# Patient Record
Sex: FEMALE | Race: WHITE | NOT HISPANIC OR LATINO | Employment: UNEMPLOYED | URBAN - METROPOLITAN AREA
[De-identification: names, ages, dates, MRNs, and addresses within clinical notes are randomized per-mention and may not be internally consistent; named-entity substitution may affect disease eponyms.]

---

## 2017-04-17 ENCOUNTER — OFFICE VISIT - HEALTHEAST (OUTPATIENT)
Dept: FAMILY MEDICINE | Facility: CLINIC | Age: 37
End: 2017-04-17

## 2017-04-17 DIAGNOSIS — R21 RASH AND NONSPECIFIC SKIN ERUPTION: ICD-10-CM

## 2017-04-17 DIAGNOSIS — R21 PITYRIASIS ROSEA-LIKE SKIN ERUPTION: ICD-10-CM

## 2017-04-17 RX ORDER — SWAB
1 SWAB, NON-MEDICATED MISCELLANEOUS DAILY
Status: SHIPPED | COMMUNITY
Start: 2017-04-17

## 2017-06-09 ENCOUNTER — HOSPITAL ENCOUNTER (OUTPATIENT)
Dept: MEDSURG UNIT | Facility: CLINIC | Age: 37
Discharge: HOME | End: 2017-06-09
Attending: OBSTETRICS & GYNECOLOGY | Admitting: OBSTETRICS & GYNECOLOGY

## 2017-06-09 RX ORDER — FERROUS SULFATE 325(65) MG
1 TABLET ORAL
Status: SHIPPED | COMMUNITY
Start: 2017-06-09

## 2017-06-09 ASSESSMENT — MIFFLIN-ST. JEOR: SCORE: 1457.4

## 2017-08-14 ENCOUNTER — ANESTHESIA - HEALTHEAST (OUTPATIENT)
Dept: OBGYN | Facility: CLINIC | Age: 37
End: 2017-08-14

## 2017-08-16 ENCOUNTER — HOME CARE/HOSPICE - HEALTHEAST (OUTPATIENT)
Dept: HOME HEALTH SERVICES | Facility: HOME HEALTH | Age: 37
End: 2017-08-16

## 2017-08-18 ENCOUNTER — HOME CARE/HOSPICE - HEALTHEAST (OUTPATIENT)
Dept: HOME HEALTH SERVICES | Facility: HOME HEALTH | Age: 37
End: 2017-08-18

## 2017-10-19 ENCOUNTER — COMMUNICATION - HEALTHEAST (OUTPATIENT)
Dept: OBGYN | Facility: CLINIC | Age: 37
End: 2017-10-19

## 2017-10-20 ENCOUNTER — COMMUNICATION - HEALTHEAST (OUTPATIENT)
Dept: OBGYN | Facility: CLINIC | Age: 37
End: 2017-10-20

## 2021-05-30 VITALS — WEIGHT: 172.1 LBS | BODY MASS INDEX: 29.54 KG/M2

## 2021-05-31 VITALS — HEIGHT: 64 IN | WEIGHT: 178 LBS | BODY MASS INDEX: 30.39 KG/M2

## 2021-06-10 NOTE — PROGRESS NOTES
Assessment/Plan    ICD-10-CM    1. Rash and nonspecific skin eruption R21    2. Pityriasis rosea-like skin eruption R21      Unclear if pityriasis rosea.  Has large patch under her left breast but not sure if this was the first lesion.  Discussed with patient.  Will recheck with her OB.      Patient Instructions   Use cool compresses to the rash.   Follow up with OB regarding rash and sinus symptoms.  If sinus symptoms increase follow up sooner to reconsider restarting antibiotics.  Can do trial of steam and heat to the face.             The risks, benefits, and treatment options of prescribed medications or other treatments have been discussed with the patient. The patient should call or schedule a follow up appointment if not improvement or any new symptoms.       Subjective:    Danelle Ford is a 36 y.o. female who presents with   Chief Complaint   Patient presents with     Skin Problem     x 1-1.5 weeks, Started spreading Friday.    .     Started with two red spots one on her thigh and one on her stomach.  3 days ago noted spread on her abdomen, sides chest and back.  She has a couple spots on her arms that are itchy but the rest of the rash is not itchy.  She was on an antibiotic for sinus infection ( augmentin) and she finished it around April 2nd or so.  She is pregnant 24 weeks with EDC of August 9th.  She has an appointment to see her OB doctor in 9 days.  She has nasal congestion, sinus pressure and dry eyes.  She has been taking sudafed for her congestion.  Her symptoms improved on the Augmentin which she took for 5 days.  Her symptoms returned in one week after finishing the augmentin.        Past medical history, social history and medications reviewed in the medical record.     ROS:  5 point review of systems negative except as noted above in the HPI, including Gen, Resp., CV, GI &  system review.     Exam:  /60  Pulse 83  Temp 98.3  F (36.8  C) (Oral)   Resp 14  Wt 172 lb 1.6 oz (78.1  kg)  LMP 11/02/2016  SpO2 98%  BMI 29.54 kg/m2     Constitutional: Non-toxic appearance. No distress.   HENT:   Right Ear: Tympanic membrane normal.   Left Ear: Tympanic membrane normal.   Mouth/Throat: Oropharynx is clear and moist and mucous membranes are normal. No oropharyngeal exudate. No tenderness noted over the maxillary sinuses.   Eyes: Conjunctivae are normal.   Neck: Neck supple.   Cardiovascular: Normal rate and regular rhythm.   No murmur heard.   Pulmonary/Chest: No respiratory distress. No wheezes or rales.   Lymphadenopathy: No cervical adenopathy.   Abdomen:  Soft, pregnancy noted, non-tender.   Skin: Skin is warm and dry. Has erythematous oval macular rash noted. One larger lesion with central clearing under her left breast. Rash mostly on chest, back, few spots on arms and legs.       No results found for this visit on 04/17/17.    No results found.

## 2021-06-11 NOTE — PROGRESS NOTES
who came here with c/o spont leaking of vaginal fluid starting at 1130 today. No bleeding or UC's per pt. Good fetal movement. No PIH s/s. Oriented to room and EFM applied.

## 2021-06-12 NOTE — ANESTHESIA POSTPROCEDURE EVALUATION
Patient: Danelle Ford  * No procedures listed *  Anesthesia type: ITN    Patient location: Labor and Delivery  Last vitals:   Vitals:    08/15/17 0500   BP: 122/70   Pulse: 71   Resp: 16   Temp: 36.8  C (98.2  F)   SpO2:      Post vital signs: stable  Level of consciousness: awake and responds to simple questions  Post-anesthesia pain: pain controlled  Post-anesthesia nausea and vomiting: no  Pulmonary: unassisted, return to baseline  Cardiovascular: stable and blood pressure at baseline  Hydration: adequate  Anesthetic events: no    QCDR Measures:  ASA# 11 - Pinky-op Cardiac Arrest: ASA11B - Patient did NOT experience unanticipated cardiac arrest  ASA# 12 - Pinky-op Mortality Rate: ASA12B - Patient did NOT die  ASA# 13 - PACU Re-Intubation Rate: NA - No ETT / LMA used for case  ASA# 10 - Composite Anes Safety: ASA10A - No serious adverse event  ASA# 38 - New Corneal Injury: ASA38A - No new exposure keratitis or corneal abrasion in PACU    Additional Notes:  No HA at this time

## 2021-06-12 NOTE — ANESTHESIA PROCEDURE NOTES
Spinal Block    Patient location during procedure: OB  Start time: 8/14/2017 5:33 PM  End time: 8/14/2017 5:49 PM    Staffing:  Performing  Anesthesiologist: INDY CASTANEDA    Preanesthetic Checklist  Completed: patient identified, risks, benefits, and alternatives discussed, timeout performed, consent obtained, at patient's request, airway assessed, oxygen available, suction available, emergency drugs available and hand hygiene performed  Spinal Block  Patient position: sitting  Prep: ChloraPrep  Patient monitoring: heart rate, cardiac monitor, continuous pulse ox and blood pressure  Approach: midline  Location: L4-5  Injection technique: single-shot  Needle type: pencil-tip   Needle gauge: 24 G      Additional Notes:  Lot [id number]  Expiration 07/18    Negative heme, paresthesia    Clear CSF

## 2021-06-12 NOTE — ANESTHESIA PREPROCEDURE EVALUATION
Anesthesia Evaluation      Patient summary reviewed     Airway    Pulmonary - negative ROS                          Cardiovascular - negative ROS   Neuro/Psych - negative ROS     Endo/Other    (+) pregnant     GI/Hepatic/Renal - negative ROS           Dental                         Anesthesia Plan  Planned anesthetic: ITN    ASA 2     Anesthetic plan and risks discussed with: patient            Patient requesting ITN, chart reviewed.  Discussed risks including hypotension, nerve damage, infection, and post dural puncture headache.  Patient understands, questions answered, and agrees to proceed.  Time out instituted prior to placement. Hands washed, sterile gloves and mask worn.

## 2021-06-16 PROBLEM — Z34.90 PREGNANT: Status: ACTIVE | Noted: 2017-08-14

## 2021-06-26 ENCOUNTER — HEALTH MAINTENANCE LETTER (OUTPATIENT)
Age: 41
End: 2021-06-26

## 2021-10-16 ENCOUNTER — HEALTH MAINTENANCE LETTER (OUTPATIENT)
Age: 41
End: 2021-10-16

## 2022-07-23 ENCOUNTER — HEALTH MAINTENANCE LETTER (OUTPATIENT)
Age: 42
End: 2022-07-23

## 2022-10-01 ENCOUNTER — HEALTH MAINTENANCE LETTER (OUTPATIENT)
Age: 42
End: 2022-10-01

## 2023-08-06 ENCOUNTER — HEALTH MAINTENANCE LETTER (OUTPATIENT)
Age: 43
End: 2023-08-06

## 2023-11-30 ENCOUNTER — HOSPITAL ENCOUNTER (EMERGENCY)
Facility: CLINIC | Age: 43
Discharge: HOME | End: 2023-11-30
Attending: STUDENT IN AN ORGANIZED HEALTH CARE EDUCATION/TRAINING PROGRAM | Admitting: STUDENT IN AN ORGANIZED HEALTH CARE EDUCATION/TRAINING PROGRAM
Payer: COMMERCIAL

## 2023-11-30 VITALS
SYSTOLIC BLOOD PRESSURE: 117 MMHG | BODY MASS INDEX: 29.12 KG/M2 | DIASTOLIC BLOOD PRESSURE: 64 MMHG | WEIGHT: 175 LBS | OXYGEN SATURATION: 99 % | HEART RATE: 91 BPM | TEMPERATURE: 100.5 F | RESPIRATION RATE: 18 BRPM

## 2023-11-30 DIAGNOSIS — B34.9 VIRAL SYNDROME: ICD-10-CM

## 2023-11-30 LAB
ALBUMIN SERPL BCG-MCNC: 4.5 G/DL (ref 3.5–5.2)
ALP SERPL-CCNC: 79 U/L (ref 40–150)
ALT SERPL W P-5'-P-CCNC: 7 U/L (ref 0–50)
ANION GAP SERPL CALCULATED.3IONS-SCNC: 10 MMOL/L (ref 7–15)
AST SERPL W P-5'-P-CCNC: 18 U/L (ref 0–45)
BASOPHILS # BLD AUTO: 0 10E3/UL (ref 0–0.2)
BASOPHILS NFR BLD AUTO: 0 %
BILIRUB SERPL-MCNC: 0.5 MG/DL
BUN SERPL-MCNC: 7.6 MG/DL (ref 6–20)
CALCIUM SERPL-MCNC: 9.5 MG/DL (ref 8.6–10)
CHLORIDE SERPL-SCNC: 103 MMOL/L (ref 98–107)
CREAT SERPL-MCNC: 0.62 MG/DL (ref 0.51–0.95)
DEPRECATED HCO3 PLAS-SCNC: 23 MMOL/L (ref 22–29)
EGFRCR SERPLBLD CKD-EPI 2021: >90 ML/MIN/1.73M2
EOSINOPHIL # BLD AUTO: 0 10E3/UL (ref 0–0.7)
EOSINOPHIL NFR BLD AUTO: 0 %
ERYTHROCYTE [DISTWIDTH] IN BLOOD BY AUTOMATED COUNT: 12.7 % (ref 10–15)
FLUAV RNA SPEC QL NAA+PROBE: NEGATIVE
FLUBV RNA RESP QL NAA+PROBE: NEGATIVE
GLUCOSE SERPL-MCNC: 102 MG/DL (ref 70–99)
HCT VFR BLD AUTO: 41.7 % (ref 35–47)
HGB BLD-MCNC: 14 G/DL (ref 11.7–15.7)
IMM GRANULOCYTES # BLD: 0 10E3/UL
IMM GRANULOCYTES NFR BLD: 0 %
LYMPHOCYTES # BLD AUTO: 0.5 10E3/UL (ref 0.8–5.3)
LYMPHOCYTES NFR BLD AUTO: 6 %
MCH RBC QN AUTO: 29.1 PG (ref 26.5–33)
MCHC RBC AUTO-ENTMCNC: 33.6 G/DL (ref 31.5–36.5)
MCV RBC AUTO: 87 FL (ref 78–100)
MONOCYTES # BLD AUTO: 0.3 10E3/UL (ref 0–1.3)
MONOCYTES NFR BLD AUTO: 3 %
NEUTROPHILS # BLD AUTO: 7.3 10E3/UL (ref 1.6–8.3)
NEUTROPHILS NFR BLD AUTO: 91 %
NRBC # BLD AUTO: 0 10E3/UL
NRBC BLD AUTO-RTO: 0 /100
PLATELET # BLD AUTO: 273 10E3/UL (ref 150–450)
POTASSIUM SERPL-SCNC: 3.8 MMOL/L (ref 3.4–5.3)
PROT SERPL-MCNC: 7.4 G/DL (ref 6.4–8.3)
RBC # BLD AUTO: 4.81 10E6/UL (ref 3.8–5.2)
RSV RNA SPEC NAA+PROBE: NEGATIVE
SARS-COV-2 RNA RESP QL NAA+PROBE: NEGATIVE
SODIUM SERPL-SCNC: 136 MMOL/L (ref 135–145)
WBC # BLD AUTO: 8.1 10E3/UL (ref 4–11)

## 2023-11-30 PROCEDURE — 96375 TX/PRO/DX INJ NEW DRUG ADDON: CPT

## 2023-11-30 PROCEDURE — 87637 SARSCOV2&INF A&B&RSV AMP PRB: CPT | Performed by: STUDENT IN AN ORGANIZED HEALTH CARE EDUCATION/TRAINING PROGRAM

## 2023-11-30 PROCEDURE — 87637 SARSCOV2&INF A&B&RSV AMP PRB: CPT | Performed by: EMERGENCY MEDICINE

## 2023-11-30 PROCEDURE — 36415 COLL VENOUS BLD VENIPUNCTURE: CPT | Performed by: STUDENT IN AN ORGANIZED HEALTH CARE EDUCATION/TRAINING PROGRAM

## 2023-11-30 PROCEDURE — 96361 HYDRATE IV INFUSION ADD-ON: CPT

## 2023-11-30 PROCEDURE — 258N000003 HC RX IP 258 OP 636: Performed by: STUDENT IN AN ORGANIZED HEALTH CARE EDUCATION/TRAINING PROGRAM

## 2023-11-30 PROCEDURE — 99284 EMERGENCY DEPT VISIT MOD MDM: CPT | Mod: 25

## 2023-11-30 PROCEDURE — 85004 AUTOMATED DIFF WBC COUNT: CPT | Performed by: STUDENT IN AN ORGANIZED HEALTH CARE EDUCATION/TRAINING PROGRAM

## 2023-11-30 PROCEDURE — 80053 COMPREHEN METABOLIC PANEL: CPT | Performed by: STUDENT IN AN ORGANIZED HEALTH CARE EDUCATION/TRAINING PROGRAM

## 2023-11-30 PROCEDURE — 250N000011 HC RX IP 250 OP 636: Performed by: STUDENT IN AN ORGANIZED HEALTH CARE EDUCATION/TRAINING PROGRAM

## 2023-11-30 PROCEDURE — 96374 THER/PROPH/DIAG INJ IV PUSH: CPT

## 2023-11-30 RX ORDER — ONDANSETRON 4 MG/1
4 TABLET, ORALLY DISINTEGRATING ORAL EVERY 8 HOURS PRN
Qty: 10 TABLET | Refills: 0 | Status: SHIPPED | OUTPATIENT
Start: 2023-11-30 | End: 2023-12-03

## 2023-11-30 RX ORDER — KETOROLAC TROMETHAMINE 15 MG/ML
15 INJECTION, SOLUTION INTRAMUSCULAR; INTRAVENOUS ONCE
Status: COMPLETED | OUTPATIENT
Start: 2023-11-30 | End: 2023-11-30

## 2023-11-30 RX ORDER — ONDANSETRON 2 MG/ML
4 INJECTION INTRAMUSCULAR; INTRAVENOUS ONCE
Status: COMPLETED | OUTPATIENT
Start: 2023-11-30 | End: 2023-11-30

## 2023-11-30 RX ADMIN — SODIUM CHLORIDE, POTASSIUM CHLORIDE, SODIUM LACTATE AND CALCIUM CHLORIDE 1000 ML: 600; 310; 30; 20 INJECTION, SOLUTION INTRAVENOUS at 16:06

## 2023-11-30 RX ADMIN — ONDANSETRON 4 MG: 2 INJECTION INTRAMUSCULAR; INTRAVENOUS at 16:06

## 2023-11-30 RX ADMIN — KETOROLAC TROMETHAMINE 15 MG: 15 INJECTION, SOLUTION INTRAMUSCULAR; INTRAVENOUS at 16:10

## 2023-11-30 NOTE — ED PROVIDER NOTES
Emergency Department Encounter         FINAL IMPRESSION:  Viral syndrome          ED COURSE AND MEDICAL DECISION MAKING       ED Course as of 11/30/23 1846   Thu Nov 30, 2023   1745 Met with the patient and performed my initial exam.   1757 Patient is a 43-year-old female with a history of depression, here from home with concerns of less than 24 hours of fevers, body aches, chills.  States that she feels tired and fatigued.  No actual chest pain or trouble breathing.  Has had a migraine at home and she has taken her own prescription medication for this.  No neck stiffness.  Generalized muscle soreness.  No dysuria or bowel movement changes.  No rashes.  No abdominal pain.  No vomiting.  Mild nausea.  Arrival she looks well.  Mildly tachycardic and febrile.  Labs out of triage are reassuring.  No white count.  No significant Lena abnormalities.  Physical examination otherwise unremarkable with no meningismus, TMs clear and throat normal.  No abdominal pain.  Plan for fluids, Toradol and most likely discharge home but I suspect patient has a viral syndrome.  No other indication for imaging or testing today.     -Patient's labs overall reassuring.  No white count.  No left shift.  Swabs negative.  Denies any vaginal bleeding or discharge.  No dysuria.  No bowel movement changes.  No abdominal pain.  No chest pain or shortness of breath.  No cough.  I do not think any other imaging is necessary today.  Unlikely meningitis considering she has no scar or back stiffness.  I suspect influenza-like illness.  Recommended conservative care measures at home plan for discharge.                     Medical Decision Making    History:  Supplemental history from: Documented in chart, if applicable  External Record(s) reviewed: Documented in chart, if applicable.    Work Up:  Chart documentation includes differential considered and any EKGs or imaging independently interpreted by provider, where specified.  In additional to work  up documented, I considered the following work up: Documented in chart, if applicable.    External consultation:  Discussion of management with another provider: Documented in chart, if applicable    Complicating factors:  Care impacted by chronic illness: Mental Health and Other: chronic serous otitis media  Care affected by social determinants of health: N/A    Disposition considerations: Discharge. No recommendations on prescription strength medication(s). See documentation for any additional details.                  Critical Care     Performed by: Bryant Monaco or    Authorized by: Bryant Monaco  Total critical care time:  minutes  Critical care was necessary to treat or prevent imminent or life-threatening deterioration of the following conditions:   Critical care was time spent personally by me on the following activities: development of treatment plan with patient or surrogate, discussions with consultants, examination of patient, evaluation of patient's response to treatment, obtaining history from patient or surrogate, ordering and performing treatments and interventions, ordering and review of laboratory studies, ordering and review of radiographic studies, re-evaluation of patient's condition and monitoring for potential decompensation.  Critical care time was exclusive of separately billable procedures and treating other patients.'    At the conclusion of the encounter I discussed the results of all the tests and the disposition. The questions were answered. The patient or family acknowledged understanding and was agreeable with the care plan.        MEDICATIONS GIVEN IN THE EMERGENCY DEPARTMENT:  Medications   lactated ringers BOLUS 1,000 mL (0 mLs Intravenous Stopped 11/30/23 1810)   ondansetron (ZOFRAN) injection 4 mg (4 mg Intravenous $Given 11/30/23 1606)   ketorolac (TORADOL) injection 15 mg (15 mg Intravenous $Given 11/30/23 1610)       NEW PRESCRIPTIONS STARTED AT TODAY'S ED VISIT:  New Prescriptions     "ONDANSETRON (ZOFRAN ODT) 4 MG ODT TAB    Take 1 tablet (4 mg) by mouth every 8 hours as needed for nausea       HPI     Patient information obtained from: Patient    Use of : N/A    Danelle Ford is a 43 year old female with a pertinent history of chronic serous otitis media, kidney stone, migraines, JEROD, who presents to this ED via walk-in for evaluation of generalized body aches.     Patient reports that she has pan and weakness in bilateral knees, feet, and ribs, endorsing generalized body aches that started last night. Notes she has ear pain in right ear, eye pain, neck pain/stiffness, severely nauseous, headaches, and has been vomiting. Endorses hyperesthesia, noting she feels uncomfortable. She took medications to help with nausea, noting it helped. Has a history of migraines. She endorses difficulty breathing even when sitting, reporting bilateral rib discomfort more on right-sided rib, noting her \"right-sided rib feels small\". Had 3 weeks of diarrhea a couple weeks ago. Denies diarrhea, abdominal pain, and vomiting currently. States she feels dehydrated. No other reported complaints or concerns at this time.      MEDICAL HISTORY     No past medical history on file.    Past Surgical History:   Procedure Laterality Date    CERVICAL DISCECTOMY      OTHER SURGICAL HISTORY      disectomydics C4/5/6    UMBILICAL HERNIA REPAIR         Social History     Tobacco Use    Smoking status: Never    Smokeless tobacco: Never   Substance Use Topics    Alcohol use: No    Drug use: No       ondansetron (ZOFRAN ODT) 4 MG ODT tab  cholecalciferol, vitamin D3, (VITAMIN D3) 2,000 unit cap  diazePAM (VALIUM) 5 MG tablet  docusate sodium (COLACE) 100 MG capsule  ferrous sulfate 325 (65 FE) MG tablet  polyethylene glycol (GLYCOLAX) 17 gram/dose powder  prenatal vitamin iron-folic acid 27mg-0.8mg (PRENATAL S) 27 mg iron- 800 mcg Tab tablet  senna-docusate (SENNOSIDES-DOCUSATE SODIUM) 8.6-50 mg tablet            PHYSICAL " EXAM     /76   Pulse 106   Temp 100.2  F (37.9  C) (Oral)   Resp 18   Wt 79.4 kg (175 lb)   LMP 11/30/2023 (Exact Date)   SpO2 98%   BMI 29.12 kg/m        PHYSICAL EXAM:     General: Patient appears well, nontoxic, comfortable  HEENT: Moist mucous membranes,  No head trauma. No meningismus. TMs clear. Throat normal.  No uvular deviation, no tonsillar asymmetry, no stridor, no drooling, no exudates, no redness.  No meningismus  Cardiovascular: Mildly tachycardic, normal rhythm, no extremity edema.  No appreciable murmur.  Respiratory: No signs of respiratory distress, lungs are clear to auscultation bilaterally with no wheezes rhonchi or rales.  Abdominal: Soft, nontender, nondistended, no palpable masses, no guarding, no rebound  Musculoskeletal: Full range of motion of joints, no deformities appreciated.  Neurological: Alert and oriented, grossly neurologically intact.  Psychological: Normal affect and mood.  Integument: No rashes appreciated          RESULTS       Labs Ordered and Resulted from Time of ED Arrival to Time of ED Departure   COMPREHENSIVE METABOLIC PANEL - Abnormal       Result Value    Sodium 136      Potassium 3.8      Carbon Dioxide (CO2) 23      Anion Gap 10      Urea Nitrogen 7.6      Creatinine 0.62      GFR Estimate >90      Calcium 9.5      Chloride 103      Glucose 102 (*)     Alkaline Phosphatase 79      AST 18      ALT 7      Protein Total 7.4      Albumin 4.5      Bilirubin Total 0.5     CBC WITH PLATELETS AND DIFFERENTIAL - Abnormal    WBC Count 8.1      RBC Count 4.81      Hemoglobin 14.0      Hematocrit 41.7      MCV 87      MCH 29.1      MCHC 33.6      RDW 12.7      Platelet Count 273      % Neutrophils 91      % Lymphocytes 6      % Monocytes 3      % Eosinophils 0      % Basophils 0      % Immature Granulocytes 0      NRBCs per 100 WBC 0      Absolute Neutrophils 7.3      Absolute Lymphocytes 0.5 (*)     Absolute Monocytes 0.3      Absolute Eosinophils 0.0      Absolute  Basophils 0.0      Absolute Immature Granulocytes 0.0      Absolute NRBCs 0.0     INFLUENZA A/B, RSV, & SARS-COV2 PCR - Normal    Influenza A PCR Negative      Influenza B PCR Negative      RSV PCR Negative      SARS CoV2 PCR Negative         No orders to display         PROCEDURES:  Procedures:  Procedures       I, Doris Parikh am serving as a scribe to document services personally performed by Bryant Monaco DO, based on my observations and the provider's statements to me.  I, Bryant Monaco DO, attest that Doris Parikh is acting in a scribe capacity, has observed my performance of the services and has documented them in accordance with my direction.    Bryant Monaco DO  Emergency Medicine  [facility] EMERGENCY ROOM       Bryant Monaco DO  11/30/23 4460

## 2023-11-30 NOTE — ED TRIAGE NOTES
Pt presents to the ED with c/o generalized body aches that started last night. Pt denies fever at home. Reports that she received botox injections yesterday for migraines, states that she hasn't had a reaction to it in the past. Endorses N/V. Pt also reports dizziness that it started, reports hx of vertigo stating that it feels like it is starting.      Triage Assessment (Adult)       Row Name 11/30/23 3836          Triage Assessment    Airway WDL WDL        Respiratory WDL    Respiratory WDL WDL        Skin Circulation/Temperature WDL    Skin Circulation/Temperature WDL WDL        Cardiac WDL    Cardiac WDL WDL        Peripheral/Neurovascular WDL    Peripheral Neurovascular WDL WDL        Cognitive/Neuro/Behavioral WDL    Cognitive/Neuro/Behavioral WDL WDL

## 2024-03-03 ENCOUNTER — HEALTH MAINTENANCE LETTER (OUTPATIENT)
Age: 44
End: 2024-03-03

## 2024-09-29 ENCOUNTER — HEALTH MAINTENANCE LETTER (OUTPATIENT)
Age: 44
End: 2024-09-29